# Patient Record
Sex: MALE | Race: WHITE | URBAN - NONMETROPOLITAN AREA
[De-identification: names, ages, dates, MRNs, and addresses within clinical notes are randomized per-mention and may not be internally consistent; named-entity substitution may affect disease eponyms.]

---

## 2024-06-11 ENCOUNTER — APPOINTMENT (OUTPATIENT)
Dept: GENERAL RADIOLOGY | Age: 74
End: 2024-06-11
Payer: MEDICARE

## 2024-06-11 ENCOUNTER — HOSPITAL ENCOUNTER (EMERGENCY)
Age: 74
End: 2024-06-12
Attending: STUDENT IN AN ORGANIZED HEALTH CARE EDUCATION/TRAINING PROGRAM
Payer: MEDICARE

## 2024-06-11 VITALS — WEIGHT: 260 LBS | TEMPERATURE: 95.4 F

## 2024-06-11 DIAGNOSIS — I46.9 CARDIAC ARREST (HCC): Primary | ICD-10-CM

## 2024-06-11 DIAGNOSIS — I21.01 ACUTE ST ELEVATION MYOCARDIAL INFARCTION (STEMI) INVOLVING LEFT MAIN CORONARY ARTERY (HCC): ICD-10-CM

## 2024-06-11 LAB — GLUCOSE BLD-MCNC: 306 MG/DL (ref 74–100)

## 2024-06-11 PROCEDURE — 96375 TX/PRO/DX INJ NEW DRUG ADDON: CPT

## 2024-06-11 PROCEDURE — 6360000002 HC RX W HCPCS: Performed by: STUDENT IN AN ORGANIZED HEALTH CARE EDUCATION/TRAINING PROGRAM

## 2024-06-11 PROCEDURE — 96366 THER/PROPH/DIAG IV INF ADDON: CPT

## 2024-06-11 PROCEDURE — 93005 ELECTROCARDIOGRAM TRACING: CPT | Performed by: STUDENT IN AN ORGANIZED HEALTH CARE EDUCATION/TRAINING PROGRAM

## 2024-06-11 PROCEDURE — 71045 X-RAY EXAM CHEST 1 VIEW: CPT

## 2024-06-11 PROCEDURE — 2500000003 HC RX 250 WO HCPCS: Performed by: STUDENT IN AN ORGANIZED HEALTH CARE EDUCATION/TRAINING PROGRAM

## 2024-06-11 PROCEDURE — 99285 EMERGENCY DEPT VISIT HI MDM: CPT

## 2024-06-11 PROCEDURE — 92950 HEART/LUNG RESUSCITATION CPR: CPT

## 2024-06-11 PROCEDURE — 96365 THER/PROPH/DIAG IV INF INIT: CPT

## 2024-06-11 PROCEDURE — 82947 ASSAY GLUCOSE BLOOD QUANT: CPT

## 2024-06-11 PROCEDURE — 96376 TX/PRO/DX INJ SAME DRUG ADON: CPT

## 2024-06-11 RX ORDER — EPINEPHRINE 0.1 MG/ML
1 INJECTION INTRAVENOUS ONCE
Status: COMPLETED | OUTPATIENT
Start: 2024-06-11 | End: 2024-06-11

## 2024-06-11 RX ORDER — CALCIUM GLUCONATE 20 MG/ML
1000 INJECTION, SOLUTION INTRAVENOUS ONCE
Status: COMPLETED | OUTPATIENT
Start: 2024-06-11 | End: 2024-06-11

## 2024-06-11 RX ADMIN — EPINEPHRINE 1 MG: 0.1 INJECTION INTRAVENOUS at 21:03

## 2024-06-11 RX ADMIN — EPINEPHRINE 1 MG: 0.1 INJECTION INTRAVENOUS at 21:39

## 2024-06-11 RX ADMIN — CALCIUM GLUCONATE 1000 MG: 20 INJECTION, SOLUTION INTRAVENOUS at 21:06

## 2024-06-11 RX ADMIN — SODIUM BICARBONATE 50 MEQ: 84 INJECTION, SOLUTION INTRAVENOUS at 21:00

## 2024-06-11 RX ADMIN — EPINEPHRINE 1 MG: 0.1 INJECTION INTRAVENOUS at 21:06

## 2024-06-11 RX ADMIN — EPINEPHRINE 1 MG: 0.1 INJECTION INTRAVENOUS at 21:20

## 2024-06-11 RX ADMIN — EPINEPHRINE 1 MG: 0.1 INJECTION INTRAVENOUS at 21:00

## 2024-06-11 RX ADMIN — EPINEPHRINE 1 MG: 0.1 INJECTION INTRAVENOUS at 21:14

## 2024-06-11 RX ADMIN — EPINEPHRINE 1 MG: 0.1 INJECTION INTRAVENOUS at 21:35

## 2024-06-11 RX ADMIN — EPINEPHRINE 1 MG: 0.1 INJECTION INTRAVENOUS at 20:57

## 2024-06-11 RX ADMIN — Medication 10 MCG/MIN: at 21:25

## 2024-06-11 RX ADMIN — EPINEPHRINE 1 MG: 0.1 INJECTION INTRAVENOUS at 21:43

## 2024-06-11 RX ADMIN — TENECTEPLASE 50 MG: KIT at 21:28

## 2024-06-11 RX ADMIN — EPINEPHRINE 1 MG: 0.1 INJECTION INTRAVENOUS at 21:17

## 2024-06-11 RX ADMIN — EPINEPHRINE 1 MG: 0.1 INJECTION INTRAVENOUS at 21:31

## 2024-06-12 LAB
EKG ATRIAL RATE: 127 BPM
EKG P AXIS: -107 DEGREES
EKG Q-T INTERVAL: 446 MS
EKG Q-T INTERVAL: 460 MS
EKG QRS DURATION: 152 MS
EKG QRS DURATION: 164 MS
EKG QTC CALCULATION (BAZETT): 388 MS
EKG QTC CALCULATION (BAZETT): 463 MS
EKG R AXIS: 115 DEGREES
EKG R AXIS: 120 DEGREES
EKG T AXIS: 63 DEGREES
EKG T AXIS: 94 DEGREES
EKG VENTRICULAR RATE: 43 BPM
EKG VENTRICULAR RATE: 65 BPM

## 2024-06-12 PROCEDURE — 93010 ELECTROCARDIOGRAM REPORT: CPT | Performed by: FAMILY MEDICINE

## 2024-06-12 NOTE — PROGRESS NOTES
Spiritual Services Interventions  01A/01A 6/11/2024  Dalila Kennedy  74 y.o. year old male    Encounter Summary  Encounter Overview/Reason: Crisis  Service Provided For: Patient and family together  Referral/Consult From: Multi-disciplinary team  Support System: Family members, Spouse, Children  Last Encounter : 06/11/24  Complexity of Encounter: High  Begin Time: 0915  End Time : (P) 1130  Total Time Calculated: (P) 135 min  Crisis  Type: Code Blue        Grief, Loss, and Adjustments  Type: (P) Death, Bereavement Care              Assessment/Intervention/Outcome  Assessment: Tearful, Stress overload, Sad, Complicated grieving, Anxious  Intervention: Discussed illness injury and it’s impact, Discussed death, afterlife, Discussed belief system/Presybeterian practices/xochitl, Active listening, Explored/Affirmed feelings, thoughts, concerns, End of Life Care, Grief Care, Prayer (assurance of)/[place], Read/Provided Scripture, Sustaining Presence/Ministry of presence  Outcome: Comfort, Engaged in conversation, Connection/Belonging, Expressed feelings, needs, and concerns, Grieving, Venting emotion

## 2024-06-12 NOTE — ED NOTES
PT arrives to ER at 2054. Pt initially had a pulse rhythm showing sinus tach. Pulses were lost and CPR was initiated @ 2056. Epi given at 2057. 18 IV started to RAC. Pulse check @ 2058 asystole. Pulse check @ 2100 PEA, Epi and bicarb given. Pule check @ 2102 PEA. Epi given @ 2103. Pulse check at 2104 PEA. Pulse check at 2106 PEA, epi and 1G of calcium gluconate given. Pulse check at 2108. Pt had a pulse rhythm showing sinus tach. Epi given at 2114. Pt heart began decreasing pt was found to be in PEA @ 2116. Epi given at 2117. Pulse check at 2118 showed PEA. Pulse check at 2120 PEA, epi given. Pulse check @ 2122. Pt has a pulse with a rhythm of sinus tach. Epi drip started at 10mcg @ 2125. 50mg bolus of TNK given at 2128. Pulse was not palpable rhythm shows PEA compressions started @ 2129. Epi drip paused @ 2129. Pulse check @ 2131 asystole epi given. Pulse check @ 2133 no pulse rhythm shows v-fib  shocked at 200j compressions resumed. Pulse check 2135 PEA, epi given. Pulse check @ 2137 PEA. Pulse check @ 2139 PEA, epi given. Pulse check @ 2141 PEA. Pulse check @ 2143 PEA, epi given. Pulse check @ 2145 PEA. Time of death called at 2145.

## 2024-06-12 NOTE — ED PROVIDER NOTES
[facility] ED  Emergency Department Encounter  Emergency Medicine Attending     Pt Name:Niall Kennedy  MRN: [id number]  Birthdate 1950  Date of evaluation: 6/11/24  PCP:  No primary care provider on file.  Note Started: 9:15 PM EDT      CHIEF COMPLAINT       Chief Complaint   Patient presents with    Cardiac Arrest       HISTORY OF PRESENT ILLNESS  (Location/Symptom, Timing/Onset, Context/Setting, Quality, Duration, Modifying Factors, Severity.)      Niall Kennedy is a 74 y.o. male who presents in cardiac arrest.  Patient was outside chopping wood when began having some discomfort, states that he stepped into his house and collapsed on the floor.  Wife called EMS who had a prolonged time getting to the patient's home.  On arrival, patient no pulses and was not breathing.  CPR took place.  Transmitted EKG appears to have either a large inferior or anterior STEMI.  Patient did receive 2 shocks in the field in addition with 5 of epinephrine.  On arrival, patient has a very weak and thready pulse, CPR immediately commenced.  PAST MEDICAL / SURGICAL / SOCIAL / FAMILY HISTORY      has no past medical history on file.       has no past surgical history on file.      Social History     Socioeconomic History    Marital status:      Spouse name: Not on file    Number of children: Not on file    Years of education: Not on file    Highest education level: Not on file   Occupational History    Not on file   Tobacco Use    Smoking status: Not on file    Smokeless tobacco: Not on file   Substance and Sexual Activity    Alcohol use: Not on file    Drug use: Not on file    Sexual activity: Not on file   Other Topics Concern    Not on file   Social History Narrative    Not on file     Social Determinants of Health     Financial Resource Strain: Not on file   Food Insecurity: Not on file   Transportation Needs: Not on file   Physical Activity: Not on file   Stress: Not on file   Social Connections: Not on

## 2024-06-14 NOTE — PROCEDURES
Central Line Placement Procedure Note    Performed by: Pierre Dunaway MD    Indication: vascular access and cardiac arrest    Consent: Unable to be obtained due to the emergent nature of this procedure.    Time out performed: Immediately prior to the procedure a \"time out\" was called to verify the correct patient, the correct procedure, equipment, support staff and site/side marked as required.      All elements of maximal sterile barrier techniques were followed.    Procedure: The patient was positioned appropriately and the skin over the right femoral vein was prepped with chloraprep. Local anesthesia was not performed due to the emergent nature of this procedure.  A large bore needle was used to identify the vein.  A guide wire was then inserted into the vein through the needle. A triple lumen catheter was then inserted into the vessel over the guide wire using the Seldinger technique but was unable to be properly threaded and was removed.     A second attempt in the left groin was later attempted. Patient was similarly prepped. Unable to gain access through large bore needle and attempt was aborted.     The patient tolerated the procedure well.    Complications: None